# Patient Record
Sex: MALE | Race: WHITE | URBAN - METROPOLITAN AREA
[De-identification: names, ages, dates, MRNs, and addresses within clinical notes are randomized per-mention and may not be internally consistent; named-entity substitution may affect disease eponyms.]

---

## 2018-07-31 ENCOUNTER — TRANSFERRED RECORDS (OUTPATIENT)
Dept: HEALTH INFORMATION MANAGEMENT | Facility: CLINIC | Age: 28
End: 2018-07-31

## 2018-08-13 ENCOUNTER — TRANSFERRED RECORDS (OUTPATIENT)
Dept: HEALTH INFORMATION MANAGEMENT | Facility: CLINIC | Age: 28
End: 2018-08-13

## 2018-08-27 ENCOUNTER — MEDICAL CORRESPONDENCE (OUTPATIENT)
Dept: HEALTH INFORMATION MANAGEMENT | Facility: CLINIC | Age: 28
End: 2018-08-27

## 2018-08-30 ENCOUNTER — PRE VISIT (OUTPATIENT)
Dept: ORTHOPEDICS | Facility: CLINIC | Age: 28
End: 2018-08-30

## 2018-08-31 NOTE — TELEPHONE ENCOUNTER
FUTURE VISIT INFORMATION      FUTURE VISIT INFORMATION:    Date: 9/25    Time: 10:00    [place] [facility] [place] INFORMATION:    Referring provider:  Alex Charles    Referring providers clinic:  [facility]    Reason for visit/diagnosis  R foot pain    RECORDS REQUESTED FROM:       Clinic name Comments Records Status Imaging Status                                         RECORDS STATUS      RECORDS RECEIVED FROM: [facility]   DATE RECEIVED: 8/30   NOTES STATUS DETAILS   OFFICE NOTE from referring provider Received 11/17/14, 11/4/16,, 3/29/17, 12/11/17, 6/28/18, 7/20/18, 8/8/18, 8/13/18,    OFFICE NOTE from other specialist N/A    DISCHARGE SUMMARY from hospital N/A    DISCHARGE REPORT from the ER N/A    OPERATIVE REPORT N/A    MEDICATION LIST N/A    IMPLANT RECORD/STICKER N/A    LABS     CBC/DIFF N/A    CULTURES N/A    INJECTIONS DONE IN RADIOLOGY N/A    MRI Received 7/31/18   CT SCAN N/A    XRAYS (IMAGES & REPORTS) Received 10/13/14, 11/17/14, 11/18/14, 12/30/14,    TUMOR     PATHOLOGY  Slides & report N/A